# Patient Record
Sex: FEMALE | Race: WHITE | Employment: UNEMPLOYED | URBAN - METROPOLITAN AREA
[De-identification: names, ages, dates, MRNs, and addresses within clinical notes are randomized per-mention and may not be internally consistent; named-entity substitution may affect disease eponyms.]

---

## 2018-01-02 ENCOUNTER — OFFICE VISIT (OUTPATIENT)
Dept: ORTHOPEDIC SURGERY | Age: 23
End: 2018-01-02

## 2018-01-02 VITALS — WEIGHT: 130 LBS | BODY MASS INDEX: 23.92 KG/M2 | HEIGHT: 62 IN

## 2018-01-02 DIAGNOSIS — S83.006A PATELLAR DISLOCATION, INITIAL ENCOUNTER: ICD-10-CM

## 2018-01-02 DIAGNOSIS — R52 PAIN: Primary | ICD-10-CM

## 2018-01-02 PROCEDURE — L1812 KO ELASTIC W/JOINTS PRE OTS: HCPCS | Performed by: PHYSICIAN ASSISTANT

## 2018-01-02 PROCEDURE — 99214 OFFICE O/P EST MOD 30 MIN: CPT | Performed by: PHYSICIAN ASSISTANT

## 2018-01-02 RX ORDER — BUSPIRONE HYDROCHLORIDE 10 MG/1
10 TABLET ORAL
COMMUNITY
Start: 2017-05-03

## 2018-01-02 NOTE — PROGRESS NOTES
are normal.      Diagnostic Testing:      Views:  2   Location:  Left knee   Findings:  X-rays taken today reveal normal anatomy with no acute bony abnormalities. Lateral tracking patella is noted    Orders     Orders Placed This Encounter   Procedures    XR KNEE LEFT (1-2 VIEWS)     72610     Order Specific Question:   Reason for exam:     Answer:   Pain    [facility] PT [facility] Physical Therapy     Referral Priority:   Routine     Referral Type:   Eval and Treat     Referral Reason:   Specialty Services Required     Requested Specialty:   Physical Therapy     Number of Visits Requested:   1    OTS Hinged Lateral Stabilizer     Patient was prescribed a Breg Hinged Lateral Stabilizer. The left knee will require stabilization / immobilization from this semi-rigid / rigid orthosis to improve their function. The orthosis will assist in protecting the affected area, provide functional support and facilitate healing. The patient was educated and fit by a healthcare professional with expert knowledge and specialization in brace application while under the direct supervision of the physician. Verbal and written instructions for the use of and application of this item were provided. They were instructed to contact the office immediately should the brace result in increased pain, decreased sensation, increased swelling or worsening of the condition. Assessment / Treatment Plan:     1. Left knee patellar dislocation    I had a discussion with the patient about this injury today and the need for 2 weeks of immobilization. She will need to remain in her knee immobilizer for one additional week. After that time, she may transition into a hinged lateral stabilizer brace and begin physical therapy. She was provided with this brace today as well as the order for therapy. I would like to see her back again in 2-3 weeks to check her progress after beginning physical therapy.   I discussed with her the

## 2018-01-09 ENCOUNTER — HOSPITAL ENCOUNTER (OUTPATIENT)
Dept: PHYSICAL THERAPY | Age: 23
Discharge: HOME | End: 2018-01-31
Admitting: ORTHOPAEDIC SURGERY

## 2018-01-11 ENCOUNTER — HOSPITAL ENCOUNTER (OUTPATIENT)
Dept: PHYSICAL THERAPY | Age: 23
Discharge: HOME | End: 2018-01-11
Admitting: ORTHOPAEDIC SURGERY

## 2018-01-11 NOTE — FLOWSHEET NOTE
pain, promoting relaxation,  increasing ROM, reducing/eliminating soft tissue swelling/inflammation/restriction, improving soft tissue extensibility and allowing for proper ROM for normal function with self care, mobility, lifting and ambulation. Modalities:  VASO to L knee for 15 min     Charges:  Timed Code Treatment Minutes: 45   Total Treatment Minutes: 60     [] EVAL (LOW) 41004 (typically 20 minutes face-to-face)  [] EVAL (MOD) 27432 (typically 30 minutes face-to-face)  [] EVAL (HIGH) 05914 (typically 45 minutes face-to-face)  [] RE-EVAL     [x] HF(67096) x  2   [] IONTO  [x] NMR (93702) x  1   [x] VASO  [] Manual (48451) x       [] Other:  [] TA x       [] Mech Traction (95929)  [] ES(attended) (14531)      [] ES (un) (41219):     GOALS:  Therapist goals for Patient:   Short Term Goals: To be achieved in: 2 weeks  1. Independent in HEP and progression per patient tolerance, in order to prevent re-injury. 2. Patient will have a decrease in pain to facilitate improvement in movement, function, and ADLs as indicated by Functional Deficits. Long Term Goals: To be achieved in: 6-8 weeks  1. Disability index score of 10% or less for the LEFS to assist with reaching prior level of function. 2. Patient will demonstrate increased AROM to L knee ext 0° and flex to 130° to allow for proper joint functioning as indicated by patients Functional Deficits. 3. Patient will demonstrate an increase in Strength to LLE to 4+/5 and good quad tone allow for proper functional mobility as indicated by patients Functional Deficits. 4. Patient will return to being able to ambulate functional distances, ascend and descend stairs with reciprocal gait  without increased symptoms or restriction. Progression Towards Functional goals:  [x] Patient is progressing as expected towards functional goals listed. [] Progression is slowed due to complexities listed. [] Progression has been slowed due to co-morbidities.   []

## 2018-01-16 ENCOUNTER — HOSPITAL ENCOUNTER (OUTPATIENT)
Dept: PHYSICAL THERAPY | Age: 23
Discharge: HOME | End: 2018-01-16
Admitting: ORTHOPAEDIC SURGERY

## 2018-01-16 NOTE — FLOWSHEET NOTE
[facility], [address] [place]  Phone: [phone]  Fax [phone]    Physical Therapy Daily Treatment Note  Date:  2018    Patient Name:  Joe Mckeon    :  1995  MRN: [id number]  Restrictions/Precautions:  Migraines, hx of R knee pain, anxiety   Medical/Treatment Diagnosis Information:  Diagnosis: L patella dislocation S83.006D  Treatment Diagnosis: L knee pain E41.039  Insurance/Certification information:  PT Insurance Information: [facility]   Physician Information:  Referring Practitioner: Karlie TAY  Plan of care signed (Y/N): sent; POC expires 3/6/18    Date of Patient follow up with Physician: 18    G-Code (if applicable):      Date G-Code Applied:  LEFS 20% 18       Progress Note: []  Yes  [x]  No  Next due by: Visit #10       Latex Allergy:  [x]NO      []YES  Preferred Language for Healthcare:   [x]English       []other:    Visit # Insurance Allowable Requires auth   3 BMN    [x]no        []yes:       Pain level:  /10 L knee  Intermittent     SUBJECTIVE:  Pt reports her knee kind of gave out last night while she was standing and taking off her shoe.  Her sister had to catch her to keep her from falling     OBJECTIVE:   Observation:   Test measurements:  Quad tone L fair        RESTRICTIONS/PRECAUTIONS: anxiety,hx of R knee pain    Exercises/Interventions:     Therapeutic Ex Sets/sec Reps Notes   Sitting gastroc and HS stretch     Hep    Sitting QS with towel roll and add squeeze with NMES  7 min 10\" on/off   Hep    Sitting knee flex with belt    Hep    Supine QS with SLR with ER with NMES 10\" on/off 5min   Hep    Supine bridge with add squeeze  5\" 2x10   Hep    bike 5 min  Full rev slow     Standing incline gastroc stretch  3x30\"     SAQ with add squeeze with NMES  10\" on/off 5min  2#    Supine psoas stretch with Lknee flex with belt  10\"x10      SL clams  3\" 2x10  RTB     Side stepping at 1/2 wall with TB  OTB at hips2 laps     Prone HS curl 3# 2x10 5\"      SL hip add 3\" 2x10   Hep 1/16/18   Prone quad stretch with belt 10\"x10  HEP 1/16/18    Standing TKE with TB with NMES       Manual Intervention      Gentle medial patella mobs                                      NMR re-education      See NMES above                                        Therapeutic Exercise and NMR EXR  [x] (61833) Provided verbal/tactile cueing for activities related to strengthening, flexibility, endurance, ROM for improvements in LE, proximal hip, and core control with self care, mobility, lifting, ambulation.  [] (69454) Provided verbal/tactile cueing for activities related to improving balance, coordination, kinesthetic sense, posture, motor skill, proprioception  to assist with LE, proximal hip, and core control in self care, mobility, lifting, ambulation and eccentric single leg control.      NMR and Therapeutic Activities:    [x] (32170 or 85797) Provided verbal/tactile cueing for activities related to improving balance, coordination, kinesthetic sense, posture, motor skill, proprioception and motor activation to allow for proper function of core, proximal hip and LE with self care and ADLs  [] (87852) Gait Re-education- Provided training and instruction to the patient for proper LE, core and proximal hip recruitment and positioning and eccentric body weight control with ambulation re-education including up and down stairs     Home Exercise Program:    [x] (67395) Reviewed/Progressed HEP activities related to strengthening, flexibility, endurance, ROM of core, proximal hip and LE for functional self-care, mobility, lifting and ambulation/stair navigation   [] (88654)Reviewed/Progressed HEP activities related to improving balance, coordination, kinesthetic sense, posture, motor skill, proprioception of core, proximal hip and LE for self care, mobility, lifting, and ambulation/stair navigation      Manual Treatments:

## 2018-01-18 ENCOUNTER — HOSPITAL ENCOUNTER (OUTPATIENT)
Dept: PHYSICAL THERAPY | Age: 23
Discharge: HOME | End: 2018-01-18
Admitting: ORTHOPAEDIC SURGERY

## 2018-01-18 NOTE — FLOWSHEET NOTE
4\" 2x10      Standing TKE with ball into wall 5\"2x10      Prone HS curl 3# 2x10 5\"      SL hip add   Hep 1/16/18   Prone quad stretch with belt 10\"x10  HEP 1/16/18    Standing TKE with TB with NMES       Manual Intervention      Gentle medial patella mobs                                      NMR re-education      See NMES above                                        Therapeutic Exercise and NMR EXR  [x] (02297) Provided verbal/tactile cueing for activities related to strengthening, flexibility, endurance, ROM for improvements in LE, proximal hip, and core control with self care, mobility, lifting, ambulation.  [] (99792) Provided verbal/tactile cueing for activities related to improving balance, coordination, kinesthetic sense, posture, motor skill, proprioception  to assist with LE, proximal hip, and core control in self care, mobility, lifting, ambulation and eccentric single leg control.      NMR and Therapeutic Activities:    [x] (31170 or 79741) Provided verbal/tactile cueing for activities related to improving balance, coordination, kinesthetic sense, posture, motor skill, proprioception and motor activation to allow for proper function of core, proximal hip and LE with self care and ADLs  [] (64506) Gait Re-education- Provided training and instruction to the patient for proper LE, core and proximal hip recruitment and positioning and eccentric body weight control with ambulation re-education including up and down stairs     Home Exercise Program:    [x] (36136) Reviewed/Progressed HEP activities related to strengthening, flexibility, endurance, ROM of core, proximal hip and LE for functional self-care, mobility, lifting and ambulation/stair navigation   [] (43599)Reviewed/Progressed HEP activities related to improving balance, coordination, kinesthetic sense, posture, motor skill, proprioception of core, proximal hip and LE for self care, mobility, lifting, and ambulation/stair navigation      Manual Treatments:  PROM / STM / Oscillations-Mobs:  G-I, II, III, IV (PA's, Inf., Post.)  [x] (83237) Provided manual therapy to mobilize LE, proximal hip and/or LS spine soft tissue/joints for the purpose of modulating pain, promoting relaxation,  increasing ROM, reducing/eliminating soft tissue swelling/inflammation/restriction, improving soft tissue extensibility and allowing for proper ROM for normal function with self care, mobility, lifting and ambulation. Modalities:  VASO to L knee for 15 min     Charges:  Timed Code Treatment Minutes: 45   Total Treatment Minutes: 60     [] EVAL (LOW) 30759 (typically 20 minutes face-to-face)  [] EVAL (MOD) 18164 (typically 30 minutes face-to-face)  [] EVAL (HIGH) 14298 (typically 45 minutes face-to-face)  [] RE-EVAL     [x] BU(02168) x  2   [] IONTO  [x] NMR (17775) x  1   [x] VASO  [] Manual (73230) x       [] Other:  [] TA x       [] Mech Traction (60266)  [] ES(attended) (54329)      [] ES (un) (67854):     GOALS:  Therapist goals for Patient:   Short Term Goals: To be achieved in: 2 weeks  1. Independent in HEP and progression per patient tolerance, in order to prevent re-injury. 2. Patient will have a decrease in pain to facilitate improvement in movement, function, and ADLs as indicated by Functional Deficits. Long Term Goals: To be achieved in: 6-8 weeks  1. Disability index score of 10% or less for the LEFS to assist with reaching prior level of function. 2. Patient will demonstrate increased AROM to L knee ext 0° and flex to 130° to allow for proper joint functioning as indicated by patients Functional Deficits. 3. Patient will demonstrate an increase in Strength to LLE to 4+/5 and good quad tone allow for proper functional mobility as indicated by patients Functional Deficits. 4. Patient will return to being able to ambulate functional distances, ascend and descend stairs with reciprocal gait  without increased symptoms or restriction.      Progression Towards Functional goals:  [x] Patient is progressing as expected towards functional goals listed. [] Progression is slowed due to complexities listed. [] Progression has been slowed due to co-morbidities. [] Plan just implemented, too soon to assess goals progression  [] Other:    ASSESSMENT:  Cont to increase L knee flex ROM. Still having trouble activtating quad without NMES.   Fatigued after tx     Treatment/Activity Tolerance:  [x] Patient tolerated treatment well [] Patient limited by fatique  [] Patient limited by pain  [] Patient limited by other medical complications  [] Other:     Prognosis: [x] Good [] Fair  [] Poor    Patient Requires Follow-up: [x] Yes  [] No    PLAN: Add ATC NV   [x] Continue per plan of care [] Alter current plan (see comments)  [] Plan of care initiated [] Hold pending MD visit [] Discharge    Electronically signed by: Bruno Day [id number]

## 2018-01-23 ENCOUNTER — HOSPITAL ENCOUNTER (OUTPATIENT)
Dept: PHYSICAL THERAPY | Age: 23
Discharge: HOME | End: 2018-01-23
Admitting: ORTHOPAEDIC SURGERY

## 2018-01-23 ENCOUNTER — OFFICE VISIT (OUTPATIENT)
Dept: ORTHOPEDIC SURGERY | Age: 23
End: 2018-01-23

## 2018-01-23 VITALS — HEIGHT: 62 IN | WEIGHT: 130.07 LBS | BODY MASS INDEX: 23.94 KG/M2

## 2018-01-23 DIAGNOSIS — S83.006A PATELLAR DISLOCATION, INITIAL ENCOUNTER: Primary | ICD-10-CM

## 2018-01-23 PROCEDURE — G8484 FLU IMMUNIZE NO ADMIN: HCPCS | Performed by: PHYSICIAN ASSISTANT

## 2018-01-23 PROCEDURE — G8427 DOCREV CUR MEDS BY ELIG CLIN: HCPCS | Performed by: PHYSICIAN ASSISTANT

## 2018-01-23 PROCEDURE — G8420 CALC BMI NORM PARAMETERS: HCPCS | Performed by: PHYSICIAN ASSISTANT

## 2018-01-23 PROCEDURE — 99213 OFFICE O/P EST LOW 20 MIN: CPT | Performed by: PHYSICIAN ASSISTANT

## 2018-01-23 PROCEDURE — 1036F TOBACCO NON-USER: CPT | Performed by: PHYSICIAN ASSISTANT

## 2018-01-23 NOTE — FLOWSHEET NOTE
[facility], [address] [facility] [place]  Phone: [phone]  Fax [phone]    Physical Therapy Daily Treatment Note  Date:  2018    Patient Name:  Bharathi Tan    :  1995  MRN: [id number]  Restrictions/Precautions:  Migraines, hx of R knee pain, anxiety   Medical/Treatment Diagnosis Information:  Diagnosis: L patella dislocation S83.006D   Treatment Diagnosis: L knee pain J55.073  Insurance/Certification information:  PT Insurance Information: [facility]   Physician Information:  Referring Practitioner: Meño TAY  Plan of care signed (Y/N): sent; POC expires 3/6/18    Date of Patient follow up with Physician:     G-Code (if applicable):      Date G-Code Applied:  LEFS 20% 18       Progress Note: []  Yes  [x]  No  Next due by: Visit #10       Latex Allergy:  [x]NO      []YES  Preferred Language for Healthcare:   [x]English       []other:    Visit # Insurance Allowable Requires auth   5 BMN    [x]no        []yes:       Pain level:  0/10 L knee      SUBJECTIVE:  Pt reports her knee is feeling good. she feels like her quad is now working.  No reports of pain     OBJECTIVE:   Observation:   Test measurements:  Quad tone fair+  D/c'd NMES       RESTRICTIONS/PRECAUTIONS: anxiety,hx of R knee pain    Exercises/Interventions:     Therapeutic Ex/NMR  Sets/sec Reps Notes   Mini wallslides with add squeeze  5\" 2x10      Sitting QS with towel roll and add squeeze with NMES  f   Hep    TKE with ball into wall 10\"x10     Supine QS with SLR with ER with NMES 1  Hep    Supine bridge with add squeeze with SB  5\" 2x10   Hep    bike 5 min       Standing incline gastroc stretch  3x30\"     SAQ with add squeeze   5\" 3x10 3#          SL clams  3\" 2x10  GTB     Side stepping at 1/2 wall with TB  OTB at hips2 laps     SL hip abd  1.5# 2x10     LSU and FSU  4\" 2x10      Standing TKE with ball into wall 5\"2x10      Prone HS curl 3# 2x10 5\"      SL hip add   Hep 1/16/18   Prone quad stretch with belt 10\"x10  HEP 1/16/18    Standing TKE with TB with NMES       Manual Intervention      Gentle medial patella mobs                                      NMR re-education                                              Therapeutic Exercise and NMR EXR  [x] (42125) Provided verbal/tactile cueing for activities related to strengthening, flexibility, endurance, ROM for improvements in LE, proximal hip, and core control with self care, mobility, lifting, ambulation.  [] (32452) Provided verbal/tactile cueing for activities related to improving balance, coordination, kinesthetic sense, posture, motor skill, proprioception  to assist with LE, proximal hip, and core control in self care, mobility, lifting, ambulation and eccentric single leg control.      NMR and Therapeutic Activities:    [x] (98667 or 16963) Provided verbal/tactile cueing for activities related to improving balance, coordination, kinesthetic sense, posture, motor skill, proprioception and motor activation to allow for proper function of core, proximal hip and LE with self care and ADLs  [] (28617) Gait Re-education- Provided training and instruction to the patient for proper LE, core and proximal hip recruitment and positioning and eccentric body weight control with ambulation re-education including up and down stairs     Home Exercise Program:    [x] (27714) Reviewed/Progressed HEP activities related to strengthening, flexibility, endurance, ROM of core, proximal hip and LE for functional self-care, mobility, lifting and ambulation/stair navigation   [] (58210)Reviewed/Progressed HEP activities related to improving balance, coordination, kinesthetic sense, posture, motor skill, proprioception of core, proximal hip and LE for self care, mobility, lifting, and ambulation/stair navigation      Manual Treatments:  PROM / STM / Oscillations-Mobs:  G-I, II, III, IV (PA's, Inf., Post.)  [x] (23973) [] Progression is slowed due to complexities listed. [] Progression has been slowed due to co-morbidities. [] Plan just implemented, too soon to assess goals progression  [] Other:    ASSESSMENT: improving quad tone .  Able to progress standing strengthening exercises and begin strengthening on machines     Treatment/Activity Tolerance:  [x] Patient tolerated treatment well [] Patient limited by fatique  [] Patient limited by pain  [] Patient limited by other medical complications  [] Other:     Prognosis: [x] Good [] Fair  [] Poor    Patient Requires Follow-up: [x] Yes  [] No    PLAN: Add ATC NV   [x] Continue per plan of care [] Alter current plan (see comments)  [] Plan of care initiated [] Hold pending MD visit [] Discharge    Electronically signed by: Pranav Pfeiffer, [id number]

## 2018-01-23 NOTE — FLOWSHEET NOTE
2x10                              Ecc.                               Inv.        Soleus Press Bilat. Ecc.                           Inv.                             Ladders                Square               Jump/Hop  Low                      Med.                      High                                                            Modality CP 15'   Initials                             KRT   Time spent with PT assistant 14'

## 2018-01-24 ENCOUNTER — HOSPITAL ENCOUNTER (OUTPATIENT)
Dept: PHYSICAL THERAPY | Age: 23
Discharge: HOME | End: 2018-01-24
Admitting: ORTHOPAEDIC SURGERY

## 2018-01-24 NOTE — FLOWSHEET NOTE
Bilat.                                                Ecc.                                Inv. Hamstring Curls Bilat. 30# 2x10 30# 2x10                              Ecc.                                Inv.          Soleus Press Bilat. Ecc.                            Inv.                                Ladders                 Square                Jump/Hop  Low                       Med.                       High                                                               Modality CP 15' CP 15'   Initials                             KRT JLW   Time spent with PT assistant 14'

## 2018-01-24 NOTE — FLOWSHEET NOTE
[facility], [address] [place] [address] [place]  Phone: [phone]  Fax [phone]    Physical Therapy Daily Treatment Note  Date:  2018    Patient Name:  Abdullahi Ordonez    :  1995  MRN: [id number]  Restrictions/Precautions:  Migraines, hx of R knee pain, anxiety   Medical/Treatment Diagnosis Information:  Diagnosis: L patella dislocation S83.006D   Treatment Diagnosis: L knee pain V79.815  Insurance/Certification information:  PT Insurance Information: [facility]   Physician Information:  Referring Practitioner: Ruddy TAY  Plan of care signed (Y/N): sent; POC expires 3/6/18    Date of Patient follow up with Physician:     G-Code (if applicable):      Date G-Code Applied:  LEFS 20% 18       Progress Note: []  Yes  [x]  No  Next due by: Visit #10       Latex Allergy:  [x]NO      []YES  Preferred Language for Healthcare:   [x]English       []other:    Visit # Insurance Allowable Requires auth   6 BMN    [x]no        []yes:       Pain level:  0/10 L knee      SUBJECTIVE:  Pt reports L knee cont to feel good not too sore from yesterday     OBJECTIVE:   Observation:   Test measurements:  Quad tone fair+  D/c'd NMES       RESTRICTIONS/PRECAUTIONS: anxiety,hx of R knee pain    Exercises/Interventions:     Therapeutic Ex/NMR  Sets/sec Reps Notes   Mini wallslides with add squeeze  5\" 2x10            TKE with ball into wall 10\"x10     Supine QS with SLR with ER with NMES 1  Hep    Supine bridge with dyno disc  5\" 2x10       bike 5 min       Standing incline gastroc stretch  3x30\"     SAQ with add squeeze   5\" 3x10 4#          SL clams    GTB     Side stepping at 1/2 wall with TB  Light GTB at hips2 laps     SL hip abd   2x10 With diagonals    Post touch back  4\" 2x10      Standing TKE with blackTB  5\"2x10      Prone HS curl      SL hip add   Hep 18   Prone quad stretch with belt 10\"x10  HEP 18    Step with SLS onto airex  5\" 2x10      Manual Intervention      Gentle medial patella mobs                                      NMR re-education                                              Therapeutic Exercise and NMR EXR  [x] (08584) Provided verbal/tactile cueing for activities related to strengthening, flexibility, endurance, ROM for improvements in LE, proximal hip, and core control with self care, mobility, lifting, ambulation.  [] (97129) Provided verbal/tactile cueing for activities related to improving balance, coordination, kinesthetic sense, posture, motor skill, proprioception  to assist with LE, proximal hip, and core control in self care, mobility, lifting, ambulation and eccentric single leg control.      NMR and Therapeutic Activities:    [x] (41313 or 43561) Provided verbal/tactile cueing for activities related to improving balance, coordination, kinesthetic sense, posture, motor skill, proprioception and motor activation to allow for proper function of core, proximal hip and LE with self care and ADLs  [] (29012) Gait Re-education- Provided training and instruction to the patient for proper LE, core and proximal hip recruitment and positioning and eccentric body weight control with ambulation re-education including up and down stairs     Home Exercise Program:    [x] (56862) Reviewed/Progressed HEP activities related to strengthening, flexibility, endurance, ROM of core, proximal hip and LE for functional self-care, mobility, lifting and ambulation/stair navigation   [] (58218)Reviewed/Progressed HEP activities related to improving balance, coordination, kinesthetic sense, posture, motor skill, proprioception of core, proximal hip and LE for self care, mobility, lifting, and ambulation/stair navigation      Manual Treatments:  PROM / STM / Oscillations-Mobs:  G-I, II, III, IV (PA's, Inf., Post.)  [x] (41304) Provided manual therapy to mobilize LE, proximal hip and/or LS spine soft tissue/joints for the

## 2018-02-01 ENCOUNTER — HOSPITAL ENCOUNTER (OUTPATIENT)
Dept: PHYSICAL THERAPY | Age: 23
Discharge: HOME | End: 2018-02-08
Attending: ORTHOPAEDIC SURGERY | Admitting: ORTHOPAEDIC SURGERY

## 2018-02-01 ENCOUNTER — HOSPITAL ENCOUNTER (OUTPATIENT)
Dept: PHYSICAL THERAPY | Age: 23
Discharge: HOME | End: 2018-02-02
Admitting: ORTHOPAEDIC SURGERY

## 2018-02-01 NOTE — FLOWSHEET NOTE
Prone quad stretch with belt 10\"x10  HEP 1/16/18    Step with SLS onto airex and 4\" step 5\" 2x10      Manual Intervention      Gentle medial patella mobs                                      NMR re-education                                              Therapeutic Exercise and NMR EXR  [x] (69304) Provided verbal/tactile cueing for activities related to strengthening, flexibility, endurance, ROM for improvements in LE, proximal hip, and core control with self care, mobility, lifting, ambulation.  [] (79277) Provided verbal/tactile cueing for activities related to improving balance, coordination, kinesthetic sense, posture, motor skill, proprioception  to assist with LE, proximal hip, and core control in self care, mobility, lifting, ambulation and eccentric single leg control.      NMR and Therapeutic Activities:    [x] (32136 or 19234) Provided verbal/tactile cueing for activities related to improving balance, coordination, kinesthetic sense, posture, motor skill, proprioception and motor activation to allow for proper function of core, proximal hip and LE with self care and ADLs  [] (63596) Gait Re-education- Provided training and instruction to the patient for proper LE, core and proximal hip recruitment and positioning and eccentric body weight control with ambulation re-education including up and down stairs     Home Exercise Program:    [x] (22889) Reviewed/Progressed HEP activities related to strengthening, flexibility, endurance, ROM of core, proximal hip and LE for functional self-care, mobility, lifting and ambulation/stair navigation   [] (09624)Reviewed/Progressed HEP activities related to improving balance, coordination, kinesthetic sense, posture, motor skill, proprioception of core, proximal hip and LE for self care, mobility, lifting, and ambulation/stair navigation      Manual Treatments:  PROM / STM / Oscillations-Mobs:  G-I, II, III, IV (PA's, Inf., Post.)  [x] (02477) Provided manual therapy to due to complexities listed. [] Progression has been slowed due to co-morbidities. [] Plan just implemented, too soon to assess goals progression  [] Other:    ASSESSMENT: decreasing edema Lknee. No c/o knee pain with there proc.   Improving quad tone     Treatment/Activity Tolerance:  [x] Patient tolerated treatment well [] Patient limited by fatique  [] Patient limited by pain  [] Patient limited by other medical complications  [] Other:     Prognosis: [x] Good [] Fair  [] Poor    Patient Requires Follow-up: [x] Yes  [] No    PLAN:    [x] Continue per plan of care [] Alter current plan (see comments)  [] Plan of care initiated [] Hold pending MD visit [] Discharge    Electronically signed by: LE [id number]

## 2018-02-08 ENCOUNTER — HOSPITAL ENCOUNTER (OUTPATIENT)
Dept: PHYSICAL THERAPY | Age: 23
Discharge: HOME | End: 2018-02-09
Admitting: ORTHOPAEDIC SURGERY

## 2018-02-08 NOTE — FLOWSHEET NOTE
See ATC sheet       TKE with ball into wall      SLS with table touches  2x10      Supine bridge SL  With SB  5\" x20       elliptical 5 min       Standing incline gastroc stretch  3x30\"     SAQ with add squeeze   5\" 3x10 6#    Plank  15\"x6      SL clams    GTB     Monster walk  Light GTB at [employer] laps     [place] BOSU  6x CW and CCW      Post touch back  6\" 2x10      Standing TKE with blackTB       HS stool walks 2 laps      Mini squat on inverted BOSU  5\" 2x10      Prone quad stretch with belt 10\"x10  HEP 1/16/18    LSD  4\"2x10      Step with SLS onto BOSU  5\" 2x10      Manual Intervention      Gentle medial patella mobs                                      NMR re-education                                              Therapeutic Exercise and NMR EXR  [x] (94576) Provided verbal/tactile cueing for activities related to strengthening, flexibility, endurance, ROM for improvements in LE, proximal hip, and core control with self care, mobility, lifting, ambulation.  [] (22492) Provided verbal/tactile cueing for activities related to improving balance, coordination, kinesthetic sense, posture, motor skill, proprioception  to assist with LE, proximal hip, and core control in self care, mobility, lifting, ambulation and eccentric single leg control.      NMR and Therapeutic Activities:    [x] (16566 or 01380) Provided verbal/tactile cueing for activities related to improving balance, coordination, kinesthetic sense, posture, motor skill, proprioception and motor activation to allow for proper function of core, proximal hip and LE with self care and ADLs  [] (82389) Gait Re-education- Provided training and instruction to the patient for proper LE, core and proximal hip recruitment and positioning and eccentric body weight control with ambulation re-education including up and down stairs     Home Exercise Program:    [x] (79090) Reviewed/Progressed HEP activities related to strengthening, flexibility, endurance, ROM of core, proximal hip and LE for functional self-care, mobility, lifting and ambulation/stair navigation   [] (38269)Reviewed/Progressed HEP activities related to improving balance, coordination, kinesthetic sense, posture, motor skill, proprioception of core, proximal hip and LE for self care, mobility, lifting, and ambulation/stair navigation      Manual Treatments:  PROM / STM / Oscillations-Mobs:  G-I, II, III, IV (PA's, Inf., Post.)  [x] (23536) Provided manual therapy to mobilize LE, proximal hip and/or LS spine soft tissue/joints for the purpose of modulating pain, promoting relaxation,  increasing ROM, reducing/eliminating soft tissue swelling/inflammation/restriction, improving soft tissue extensibility and allowing for proper ROM for normal function with self care, mobility, lifting and ambulation. Modalities:  Pt declined     Charges:  Timed Code Treatment Minutes: 60   Total Treatment Minutes: 60     [] EVAL (LOW) 47394 (typically 20 minutes face-to-face)  [] EVAL (MOD) 18417 (typically 30 minutes face-to-face)  [] EVAL (HIGH) 51599 (typically 45 minutes face-to-face)  [] RE-EVAL     [x] JV(68761) x  2   [] IONTO  [x] NMR (57276) x  2   [] VASO  [] Manual (97970) x       [] Other:  [] TA x       [] Mech Traction (19898)  [] ES(attended) (01006)      [] ES (un) (09325):     GOALS:  Therapist goals for Patient:   Short Term Goals: To be achieved in: 2 weeks  1. Independent in HEP and progression per patient tolerance, in order to prevent re-injury. met  2. Patient will have a decrease in pain to facilitate improvement in movement, function, and ADLs as indicated by Functional Deficits. met    Long Term Goals: To be achieved in: 6-8 weeks  1. Disability index score of 10% or less for the LEFS to assist with reaching prior level of function. met  2.  Patient will demonstrate increased AROM to L knee ext 0° and flex to 130° to allow for proper joint functioning as indicated by patients Functional

## 2018-02-08 NOTE — DISCHARGE SUMMARY
[facility], [address]  Phone: [phone]  Fax [phone]       Physical Therapy Discharge  Date: 2018        Patient Name:  Nataliia Sánchez    :  1995  MRN: [id number]  Referring Physician: Dr. Alycia Estrada   Diagnosis:L patella dislocation                         ICD Code: H15.330J   [] Surgical [x] Conservative  Therapy Diagnosis/Practice Pattern: 4E       Number of Comorbidities:  []0     [x]1-2    []3+  Total number of visits: 8   Reporting Period:   Beginning Date:18   End Date:18    OBJECTIVE  Test used Initial score Discharge Score   Pain Summary  0-4/10 pain 0/10 pain    Functional questionnaire LEFS  20% 5%   Functional Testing            ROM Knee ext  -1° 0°    Knee flex 105° 136°   Strength Hip flex  Hip abd  4  4- 4+  4/4+    Knee ext   knee flex 3+  4 4/4+  4+        Functional Limitation G-Code (if applicable):         PT G-Codes  Functional Assessment Tool Used: LEFS   Score: 5%  Functional Limitation: Mobility: Walking and moving around  Mobility: Walking and Moving Around Current Status (): At least 1 percent but less than 20 percent impaired, limited or restricted  Mobility: Walking and Moving Around Goal Status (): At least 1 percent but less than 20 percent impaired, limited or restricted  Mobility: Walking and Moving Around Discharge Status ():  At least 1 percent but less than 20 percent impaired, limited or restricted   Test/tests used to determine % limitation:  Actual Score used to drive % limitation:    Treatment to date:  [x] Therapeutic Exercise    [x] Modalities:  [] Therapeutic Activity             []Ultrasound            [x]Electrical Stimulation  [x] Gait Training     []Cervical Traction    [] Lumbar Traction  [x] Neuromuscular Re-education [x] Cold/hotpack         []Iontophoresis  [x] Instruction in HEP      Other:  [x] Manual Therapy [x] VASO                      ? []   Assessment:  [] All Goals were achieved. [x] The following goals were achieved (#'s): all except LTG 3  [x] The following goals were not achieved for the following reasons: LTG 3: increased strength but not to goal  Summary/assessmen of improvement as it relates to each goal: pt has decreased edema, no pain,weaning from brace. Pt able to ambulate and ascend and descend stairs with reciprocal gait;full knee ROM,increased LLEstrength     Plan of Care:  [x] Discharge from Therapy Services due to:    Reason for Discharge:   [x] most goals achieved    [] Patient having surgery  [] Physician discontinued therapy  [] Insurance/Financial Limitations [] Patient did not return for follow up visits [] Home program/1 visit only   [] No subjective or objective improvement [] Plateaued   [] Patient was unable to adhere to the plan of care established at evaluation. [] Referred back to physician for re-evaluation and did not return.      [] Other:?       Electronically signed by:  Twila Hester, [id number]